# Patient Record
(demographics unavailable — no encounter records)

---

## 2025-01-31 NOTE — DISCUSSION/SUMMARY
[FreeTextEntry1] : pt to continue to monitor bleeding - if continues to imrpove will stay on current OCP, if bleeds midcycle recommend increase in estrogen

## 2025-01-31 NOTE — PHYSICAL EXAM
[Chaperone Present] : A chaperone was present in the examining room during all aspects of the physical examination [30515] : A chaperone was present during the pelvic exam. [Appropriately responsive] : appropriately responsive [Alert] : alert [No Acute Distress] : no acute distress [Soft] : soft [Non-tender] : non-tender [Non-distended] : non-distended [Oriented x3] : oriented x3 [Examination Of The Breasts] : a normal appearance [No Masses] : no breast masses were palpable [Labia Majora] : normal [Labia Minora] : normal [Normal] : normal [Uterine Adnexae] : normal [FreeTextEntry1] : resolving abscess?  - ;healing lesion right labia majora - pt states she thought she had an ingrown hair - was draining pus and blood

## 2025-01-31 NOTE — HISTORY OF PRESENT ILLNESS
[TextBox_4] : 25yo presents for annual exam on ocp since 15yo for dysmenorrhea switched in Sept for irregular bleeding states she is still getting her period every 2 weeks - full period - this month may have been better   [PapSmeardate] : 2024